# Patient Record
Sex: MALE | Race: WHITE | NOT HISPANIC OR LATINO | Employment: FULL TIME | URBAN - METROPOLITAN AREA
[De-identification: names, ages, dates, MRNs, and addresses within clinical notes are randomized per-mention and may not be internally consistent; named-entity substitution may affect disease eponyms.]

---

## 2019-12-23 ENCOUNTER — APPOINTMENT (EMERGENCY)
Dept: RADIOLOGY | Facility: HOSPITAL | Age: 24
End: 2019-12-23
Attending: EMERGENCY MEDICINE
Payer: COMMERCIAL

## 2019-12-23 ENCOUNTER — HOSPITAL ENCOUNTER (EMERGENCY)
Facility: HOSPITAL | Age: 24
Discharge: HOME | End: 2019-12-23
Attending: EMERGENCY MEDICINE
Payer: COMMERCIAL

## 2019-12-23 VITALS
DIASTOLIC BLOOD PRESSURE: 61 MMHG | OXYGEN SATURATION: 98 % | RESPIRATION RATE: 16 BRPM | BODY MASS INDEX: 20.53 KG/M2 | HEART RATE: 78 BPM | TEMPERATURE: 98.9 F | WEIGHT: 160 LBS | SYSTOLIC BLOOD PRESSURE: 117 MMHG | HEIGHT: 74 IN

## 2019-12-23 DIAGNOSIS — G93.0 ARACHNOID CYST: ICD-10-CM

## 2019-12-23 DIAGNOSIS — G43.109 MIGRAINE WITH AURA AND WITHOUT STATUS MIGRAINOSUS, NOT INTRACTABLE: Primary | ICD-10-CM

## 2019-12-23 LAB
ANION GAP SERPL CALC-SCNC: 10 MEQ/L (ref 3–15)
BASOPHILS # BLD: 0.03 K/UL (ref 0.01–0.1)
BASOPHILS NFR BLD: 0.4 %
BUN SERPL-MCNC: 22 MG/DL (ref 8–20)
CALCIUM SERPL-MCNC: 10.4 MG/DL (ref 8.9–10.3)
CHLORIDE SERPL-SCNC: 103 MEQ/L (ref 98–109)
CO2 SERPL-SCNC: 27 MEQ/L (ref 22–32)
CREAT SERPL-MCNC: 1 MG/DL
DIFFERENTIAL METHOD BLD: NORMAL
EOSINOPHIL # BLD: 0.14 K/UL (ref 0.04–0.54)
EOSINOPHIL NFR BLD: 1.9 %
ERYTHROCYTE [DISTWIDTH] IN BLOOD BY AUTOMATED COUNT: 11.8 % (ref 11.6–14.4)
GFR SERPL CREATININE-BSD FRML MDRD: >60 ML/MIN/1.73M*2
GLUCOSE SERPL-MCNC: 94 MG/DL (ref 70–99)
HCT VFR BLDCO AUTO: 47.4 % (ref 40.1–51)
HGB BLD-MCNC: 16.4 G/DL
IMM GRANULOCYTES # BLD AUTO: 0.03 K/UL (ref 0–0.08)
IMM GRANULOCYTES NFR BLD AUTO: 0.4 %
LYMPHOCYTES # BLD: 1.49 K/UL (ref 1.2–3.5)
LYMPHOCYTES NFR BLD: 20.8 %
MCH RBC QN AUTO: 30.3 PG (ref 28–33.2)
MCHC RBC AUTO-ENTMCNC: 34.6 G/DL (ref 32.2–36.5)
MCV RBC AUTO: 87.6 FL (ref 83–98)
MONOCYTES # BLD: 0.61 K/UL (ref 0.3–1)
MONOCYTES NFR BLD: 8.5 %
NEUTROPHILS # BLD: 4.88 K/UL (ref 1.7–7)
NEUTS SEG NFR BLD: 68 %
NRBC BLD-RTO: 0 %
PDW BLD AUTO: 9.4 FL (ref 9.4–12.4)
PLATELET # BLD AUTO: 228 K/UL
POTASSIUM SERPL-SCNC: 4.1 MEQ/L (ref 3.6–5.1)
RBC # BLD AUTO: 5.41 M/UL (ref 4.5–5.8)
SODIUM SERPL-SCNC: 140 MEQ/L (ref 136–144)
TROPONIN I SERPL-MCNC: <0.02 NG/ML
WBC # BLD AUTO: 7.18 K/UL

## 2019-12-23 PROCEDURE — 99284 EMERGENCY DEPT VISIT MOD MDM: CPT

## 2019-12-23 PROCEDURE — 85025 COMPLETE CBC W/AUTO DIFF WBC: CPT | Performed by: EMERGENCY MEDICINE

## 2019-12-23 PROCEDURE — 70450 CT HEAD/BRAIN W/O DYE: CPT

## 2019-12-23 PROCEDURE — 84484 ASSAY OF TROPONIN QUANT: CPT | Performed by: EMERGENCY MEDICINE

## 2019-12-23 PROCEDURE — 36415 COLL VENOUS BLD VENIPUNCTURE: CPT | Performed by: EMERGENCY MEDICINE

## 2019-12-23 PROCEDURE — 80048 BASIC METABOLIC PNL TOTAL CA: CPT | Performed by: EMERGENCY MEDICINE

## 2019-12-23 RX ORDER — ACETAMINOPHEN 325 MG/1
975 TABLET ORAL ONCE
Status: DISCONTINUED | OUTPATIENT
Start: 2019-12-23 | End: 2019-12-23 | Stop reason: HOSPADM

## 2019-12-23 ASSESSMENT — ENCOUNTER SYMPTOMS
NAUSEA: 0
WEAKNESS: 0
LIGHT-HEADEDNESS: 1
PHOTOPHOBIA: 0
ACTIVITY CHANGE: 0
DIZZINESS: 1
HEADACHES: 1

## 2019-12-23 NOTE — DISCHARGE INSTRUCTIONS
Drink plenty of fluids.     You can use ibuprofen OR excedrin migraine over the counter.     Your CT showed an arachnoid cyst in the left side of your brain.     Please follow up with neurologist as listed below. If you cannot get an appt right away, please make an appt with primary care doctor as listed below.     Return to ER for any new/worsening symptoms.

## 2019-12-23 NOTE — ED PROVIDER NOTES
HPI     Chief Complaint   Patient presents with   • Migraine         History provided by:  Patient   used: No         24 y.o. male with no significant PMHx presents to the ED for evaluation of constant HA since 3 weeks ago. The headache is in the back and top of his head on the right side and varies in intensity from dull and constant to sharp. Right now it is 1/10, but this morning it was worse. It is improved by ibuprofen or ASA; he has not tried Tylenol. He reports associated vertigo, lightheadedness, and occasional blurred vision/seeing spots in both eyes. He denies weakness, trouble ambulating, nausea, tinnitus, ear pain, or photophobia. He has no history of migraines. He drove himself from his work as an . He took 400mg ibuprofen at 0800 this morning, 3 hours PTA.       Patient History     History reviewed. No pertinent past medical history.    History reviewed. No pertinent surgical history.    History reviewed. No pertinent family history.    Social History     Tobacco Use   • Smoking status: Never Smoker   • Smokeless tobacco: Never Used   Substance Use Topics   • Alcohol use: Not Currently   • Drug use: Not Currently       Systems Reviewed from Nursing Triage:          Review of Systems     Review of Systems   Constitutional: Negative for activity change (no trouble walking).   HENT: Negative for ear pain and tinnitus.    Eyes: Positive for visual disturbance. Negative for photophobia.   Gastrointestinal: Negative for nausea.   Neurological: Positive for dizziness, light-headedness and headaches. Negative for weakness.        Physical Exam     ED Triage Vitals [12/23/19 1038]   Temp Heart Rate Resp BP SpO2   36.8 °C (98.2 °F) 89 16 120/76 98 %      Temp Source Heart Rate Source Patient Position BP Location FiO2 (%) (Set)   Temporal Monitor Sitting Left upper arm --                     Patient Vitals for the past 24 hrs:   BP Temp Temp src Pulse Resp SpO2 Height Weight  "  12/23/19 1249 -- 37.2 °C (98.9 °F) -- -- -- -- -- --   12/23/19 1245 117/61 -- -- 78 16 98 % -- --   12/23/19 1038 120/76 36.8 °C (98.2 °F) Temporal 89 16 98 % 1.88 m (6' 2\") 72.6 kg (160 lb)                                       Physical Exam   Constitutional: He appears well-developed and well-nourished.   HENT:   Head: Normocephalic and atraumatic.   Eyes: Pupils are equal, round, and reactive to light. EOM are normal.   Cardiovascular: Normal heart sounds.   Pulmonary/Chest: Breath sounds normal.   Musculoskeletal: He exhibits no deformity.   Neurological: He is alert.   CN II-XII grossly intact. No gross motor deficit. Symmetric facial movements. Sensation to light touch is intact in upper and lower extremities B/L. Speech is clear. Normal finger to nose B/L.      Skin: Skin is warm and dry.   Nursing note and vitals reviewed.           Procedures    ED Course & MDM     Labs Reviewed   BASIC METABOLIC PANEL - Abnormal       Result Value    Sodium 140      Potassium 4.1      Chloride 103      CO2 27      BUN 22 (*)     Creatinine 1.0      Glucose 94      Calcium 10.4 (*)     eGFR >60.0      Anion Gap 10     TROPONIN I - Normal    Troponin I <0.02     CBC - Normal    WBC 7.18      RBC 5.41      Hemoglobin 16.4      Hematocrit 47.4      MCV 87.6      MCH 30.3      MCHC 34.6      RDW 11.8      Platelets 228      MPV 9.4     CBC AND DIFFERENTIAL    Narrative:     The following orders were created for panel order CBC and differential.  Procedure                               Abnormality         Status                     ---------                               -----------         ------                     CBC[[id number]]                          Normal              Final result               Diff Count[[id number]]                                       Final result                 Please view results for these tests on the individual orders.   RAINBOW DRAW PANEL    Narrative:     The following orders were created for " panel order [place] Draw Panel.  Procedure                               Abnormality         Status                     ---------                               -----------         ------                     RAINBOW RED[[id number]]                                      Final result               RAINBOW LT BLUE[[id number]]                                  Final result               RAINBOW GOLD[[id number]]                                     Final result                 Please view results for these tests on the individual orders.   DIFF COUNT    Differential Type Auto      nRBC 0.0      Immature Granulocytes 0.4      Neutrophils 68.0      Lymphocytes 20.8      Monocytes 8.5      Eosinophils 1.9      Basophils 0.4      Immature Granulocytes, Absolute 0.03      Neutrophils, Absolute 4.88      Lymphocytes, Absolute 1.49      Monocytes, Absolute 0.61      Eosinophils, Absolute 0.14      Basophils, Absolute 0.03     RAINBOW RED   RAINBOW LT BLUE   RAINBOW GOLD       CT HEAD WITHOUT IV CONTRAST   Final Result   IMPRESSION:   No CT evidence of an acute large vascular territorial infarct, acute   intracranial hemorrhage or mass effect.   CSF attenuated structure in the left side of the posterior fossa, likely   representing an arachnoid cyst.                  [facility]         ED Course as of Dec 23 2246   Mon Dec 23, 2019   1138 Labs, CT head, tylenol, IV fluids  Offered reglan/benadryl but pt is driving himself and unable to get a ride today    [JL]   1235 Pt feeling slightly better   Labs reviewed, slightly elevated BUN of 22 suggestive of dehydration but otherwise labs are unremarkable, does not want additional medication for headache  Pt does have an arachnoid cyst on the left which is likely incidental; his headaches are right sided and this is probably unrelated, however given his symptoms and new arachnoid cyst visualized on CT today, pt to f/u with neurology; will also give him PCP to follow up with  Pt to use  ibuprofen/excedrin for headaches as needed, encouraged PO fluids  Return for worsening  Discussed w/ Dr. Whatley    [JL]      ED Course User Index  [JL] Judy Lopes PA C         Clinical Impressions as of Dec 23 2246   Migraine with aura and without status migrainosus, not intractable   Arachnoid cyst      By signing my name below, I, Tom Watkins, attest that this documentation has been prepared under the direction and in the presence of Judy Lopes PA-C.    12/23/2019 10:46 PM         Tom Watkins  12/23/19 1134       Judy Lopes PA C  12/23/19 3596

## 2019-12-27 NOTE — ED ATTESTATION NOTE
The patient was evaluated and managed by the physician assistant / nurse practitioner.  Discussed case with the physician assistant.  Went over history and physical findings.  24-year-old male with a headache for 3 weeks.  Otherwise is functioning well.  Waxes and wanes.  Physical exam was unremarkable.  Laboratory studies were negative.  Imaging was negative for acute issues.  Patient does have an arachnoid cyst likely congenital.  Patient was feeling better.  Agree with PA findings and plan.     King Whatley MD  12/26/19 2334

## 2020-07-21 ENCOUNTER — APPOINTMENT (RX ONLY)
Dept: URBAN - METROPOLITAN AREA CLINIC 23 | Facility: CLINIC | Age: 25
Setting detail: DERMATOLOGY
End: 2020-07-21

## 2020-07-21 DIAGNOSIS — Q828 OTHER SPECIFIED ANOMALIES OF SKIN: ICD-10-CM

## 2020-07-21 DIAGNOSIS — Q826 OTHER SPECIFIED ANOMALIES OF SKIN: ICD-10-CM

## 2020-07-21 DIAGNOSIS — D22 MELANOCYTIC NEVI: ICD-10-CM

## 2020-07-21 DIAGNOSIS — Q819 OTHER SPECIFIED ANOMALIES OF SKIN: ICD-10-CM

## 2020-07-21 PROBLEM — D22.5 MELANOCYTIC NEVI OF TRUNK: Status: ACTIVE | Noted: 2020-07-21

## 2020-07-21 PROBLEM — L85.8 OTHER SPECIFIED EPIDERMAL THICKENING: Status: ACTIVE | Noted: 2020-07-21

## 2020-07-21 PROBLEM — D22.62 MELANOCYTIC NEVI OF LEFT UPPER LIMB, INCLUDING SHOULDER: Status: ACTIVE | Noted: 2020-07-21

## 2020-07-21 PROCEDURE — ? ADDITIONAL NOTES

## 2020-07-21 PROCEDURE — ? COUNSELING

## 2020-07-21 PROCEDURE — 99202 OFFICE O/P NEW SF 15 MIN: CPT

## 2020-07-21 PROCEDURE — ? TREATMENT REGIMEN

## 2020-07-21 ASSESSMENT — LOCATION DETAILED DESCRIPTION DERM
LOCATION DETAILED: LEFT VENTRAL DISTAL FOREARM
LOCATION DETAILED: LEFT DISTAL POSTERIOR UPPER ARM
LOCATION DETAILED: LEFT ANTERIOR DISTAL THIGH
LOCATION DETAILED: EPIGASTRIC SKIN
LOCATION DETAILED: RIGHT DISTAL POSTERIOR UPPER ARM
LOCATION DETAILED: RIGHT ANTERIOR DISTAL THIGH

## 2020-07-21 ASSESSMENT — LOCATION ZONE DERM
LOCATION ZONE: LEG
LOCATION ZONE: TRUNK
LOCATION ZONE: ARM

## 2020-07-21 ASSESSMENT — LOCATION SIMPLE DESCRIPTION DERM
LOCATION SIMPLE: ABDOMEN
LOCATION SIMPLE: RIGHT UPPER ARM
LOCATION SIMPLE: LEFT FOREARM
LOCATION SIMPLE: LEFT UPPER ARM
LOCATION SIMPLE: RIGHT THIGH
LOCATION SIMPLE: LEFT THIGH

## 2020-07-21 NOTE — PROCEDURE: TREATMENT REGIMEN
Detail Level: Simple
Otc Regimen: Eucerin Roughness Relief Spot Treatment once daily
Samples Given: Eucerin Roughness Relief Spot Treatment

## 2021-12-23 ENCOUNTER — OFFICE VISIT (OUTPATIENT)
Dept: FAMILY MEDICINE | Facility: CLINIC | Age: 26
End: 2021-12-23
Payer: COMMERCIAL

## 2021-12-23 VITALS
TEMPERATURE: 98.4 F | OXYGEN SATURATION: 99 % | RESPIRATION RATE: 16 BRPM | SYSTOLIC BLOOD PRESSURE: 110 MMHG | BODY MASS INDEX: 20.3 KG/M2 | HEIGHT: 74 IN | HEART RATE: 98 BPM | WEIGHT: 158.2 LBS | DIASTOLIC BLOOD PRESSURE: 68 MMHG

## 2021-12-23 DIAGNOSIS — N52.9 ERECTILE DYSFUNCTION, UNSPECIFIED ERECTILE DYSFUNCTION TYPE: ICD-10-CM

## 2021-12-23 DIAGNOSIS — Z13.220 LIPID SCREENING: ICD-10-CM

## 2021-12-23 DIAGNOSIS — Z00.00 ANNUAL PHYSICAL EXAM: Primary | ICD-10-CM

## 2021-12-23 PROCEDURE — 3008F BODY MASS INDEX DOCD: CPT | Performed by: STUDENT IN AN ORGANIZED HEALTH CARE EDUCATION/TRAINING PROGRAM

## 2021-12-23 PROCEDURE — 99385 PREV VISIT NEW AGE 18-39: CPT | Performed by: STUDENT IN AN ORGANIZED HEALTH CARE EDUCATION/TRAINING PROGRAM

## 2021-12-23 NOTE — PROGRESS NOTES
"Subjective      Patient ID: Maury Quinteroviich is a 26 y.o. male.  1995      HPI     Pt here for routine wellness exam.    Last Wellness exam: about a year ago   Labs: Due  Eye Exam: due  Dental Exam:  UTD  Immunizations: waiting for second shot  Caffeine: 5 cups of coffee per week  Exercise: no formal exercise  Diet: needs improvement  Occupation:   Seatbelts/sunscreen/smoke detectors: yes  Substance use: e-cigarettes, social alcohol user  Feel safe at home/in all relationships:  PHQ2: 1 related to erectile dysfunction, see below  Partners: monogamous    ROS:  Requests referral to urology as he has self-diagnosed sclerosing lymphadenitis. Reports difficulty with erections for about the last 8 months. Tried to schedule with urology recently and was told he needed a referral.  Had STD panel in July and about 8 months ago - both were negative.  Denies chronic stress.    The following have been reviewed and updated as appropriate in this visit:   Allergies  Meds  Problems       Review of Systems reviewed and as noted in the HPI.    Objective     Vitals:    12/23/21 1357   BP: 110/68   BP Location: Left upper arm   Patient Position: Sitting   Pulse: 98   Resp: 16   Temp: 36.9 °C (98.4 °F)   TempSrc: Temporal   SpO2: 99%   Weight: 71.8 kg (158 lb 3.2 oz)   Height: 1.88 m (6' 2\")     Body mass index is 20.31 kg/m².    Physical Exam  Vitals reviewed.   HENT:      Head: Normocephalic and atraumatic.      Right Ear: Tympanic membrane normal.      Left Ear: Tympanic membrane normal.   Eyes:      Extraocular Movements: Extraocular movements intact.   Cardiovascular:      Rate and Rhythm: Normal rate and regular rhythm.   Pulmonary:      Effort: Pulmonary effort is normal.      Breath sounds: Normal breath sounds.   Abdominal:      General: Bowel sounds are normal. There is no distension.      Palpations: Abdomen is soft.      Tenderness: There is no abdominal tenderness.   Musculoskeletal:      Right lower leg: " No edema.      Left lower leg: No edema.   Skin:     General: Skin is warm and dry.   Neurological:      General: No focal deficit present.      Mental Status: He is alert and oriented to person, place, and time.   Psychiatric:         Mood and Affect: Mood normal.         Assessment/Plan   Diagnoses and all orders for this visit:    Annual physical exam (Primary)  Take all medications as prescribed  Recommend to get 30 minutes of exercise most days of the week  Eat a diet with several servings of fruits and vegetables daily  Visit the eye doctor and dentist yearly  Follow up fasting labs  -     CBC and differential; Future  -     Comprehensive metabolic panel; Future    Lipid screening  -     Lipid panel; Future    Erectile dysfunction, unspecified erectile dysfunction type  Assessment & Plan:  Negative recent STD testing  No chronic stress  Occasional use of alcohol and e-cigarettes  Follow up labs as above  Referral to urology provided as requested    Orders:  -     Ambulatory referral to Urology; Future  -     Lipid panel; Future  -     TSH; Future      Magalys Pozo MD  12/23/2021

## 2021-12-23 NOTE — ASSESSMENT & PLAN NOTE
Negative recent STD testing  No chronic stress  Occasional use of alcohol and e-cigarettes  Follow up labs as above  Referral to urology provided as requested

## 2021-12-23 NOTE — PATIENT INSTRUCTIONS
Take all medications as prescribed  Recommend to get 30 minutes of exercise most days of the week  Eat a diet with several servings of fruits and vegetables daily  Visit the eye doctor and dentist yearly  Complete your lab work - we will call you with the results. Please fast for 8 hours prior to the blood draw (water and black coffee only).  Cerave SA cream

## 2022-01-12 LAB
BASOPHILS # BLD AUTO: 0 X10E3/UL (ref 0–0.2)
BASOPHILS NFR BLD AUTO: 1 %
BUN SERPL-MCNC: 14 MG/DL (ref 6–20)
BUN/CREAT SERPL: 14 (ref 9–20)
CALCIUM SERPL-MCNC: 9.8 MG/DL (ref 8.7–10.2)
CHLORIDE SERPL-SCNC: 104 MMOL/L (ref 96–106)
CHOLEST SERPL-MCNC: 153 MG/DL (ref 100–199)
CO2 SERPL-SCNC: 24 MMOL/L (ref 20–29)
CREAT SERPL-MCNC: 0.99 MG/DL (ref 0.76–1.27)
EOSINOPHIL # BLD AUTO: 0.1 X10E3/UL (ref 0–0.4)
EOSINOPHIL NFR BLD AUTO: 2 %
ERYTHROCYTE [DISTWIDTH] IN BLOOD BY AUTOMATED COUNT: 12.8 % (ref 11.6–15.4)
GLUCOSE SERPL-MCNC: 97 MG/DL (ref 65–99)
HCT VFR BLD AUTO: 43 % (ref 37.5–51)
HDLC SERPL-MCNC: 45 MG/DL
HGB BLD-MCNC: 15.1 G/DL (ref 13–17.7)
IMM GRANULOCYTES # BLD AUTO: 0 X10E3/UL (ref 0–0.1)
IMM GRANULOCYTES NFR BLD AUTO: 0 %
LAB CORP EGFR IF AFRICN AM: 123 ML/MIN/1.73
LAB CORP EGFR IF NONAFRICN AM: 106 ML/MIN/1.73
LDLC SERPL CALC-MCNC: 86 MG/DL (ref 0–99)
LYMPHOCYTES # BLD AUTO: 1.8 X10E3/UL (ref 0.7–3.1)
LYMPHOCYTES NFR BLD AUTO: 28 %
MCH RBC QN AUTO: 30.1 PG (ref 26.6–33)
MCHC RBC AUTO-ENTMCNC: 35.1 G/DL (ref 31.5–35.7)
MCV RBC AUTO: 86 FL (ref 79–97)
MONOCYTES # BLD AUTO: 0.5 X10E3/UL (ref 0.1–0.9)
MONOCYTES NFR BLD AUTO: 8 %
NEUTROPHILS # BLD AUTO: 4 X10E3/UL (ref 1.4–7)
NEUTROPHILS NFR BLD AUTO: 61 %
PLATELET # BLD AUTO: 239 X10E3/UL (ref 150–450)
POTASSIUM SERPL-SCNC: 4.7 MMOL/L (ref 3.5–5.2)
RBC # BLD AUTO: 5.02 X10E6/UL (ref 4.14–5.8)
SODIUM SERPL-SCNC: 141 MMOL/L (ref 134–144)
SPECIMEN STATUS: NORMAL
TRIGL SERPL-MCNC: 121 MG/DL (ref 0–149)
TSH SERPL DL<=0.005 MIU/L-ACNC: 2.53 UIU/ML (ref 0.45–4.5)
VLDLC SERPL CALC-MCNC: 22 MG/DL (ref 5–40)
WBC # BLD AUTO: 6.5 X10E3/UL (ref 3.4–10.8)